# Patient Record
Sex: MALE | Race: BLACK OR AFRICAN AMERICAN | NOT HISPANIC OR LATINO | Employment: FULL TIME | URBAN - METROPOLITAN AREA
[De-identification: names, ages, dates, MRNs, and addresses within clinical notes are randomized per-mention and may not be internally consistent; named-entity substitution may affect disease eponyms.]

---

## 2018-06-11 ENCOUNTER — APPOINTMENT (EMERGENCY)
Dept: RADIOLOGY | Facility: HOSPITAL | Age: 48
End: 2018-06-11
Payer: COMMERCIAL

## 2018-06-11 ENCOUNTER — HOSPITAL ENCOUNTER (EMERGENCY)
Facility: HOSPITAL | Age: 48
Discharge: HOME | End: 2018-06-11
Attending: EMERGENCY MEDICINE | Admitting: EMERGENCY MEDICINE
Payer: COMMERCIAL

## 2018-06-11 VITALS
TEMPERATURE: 97.4 F | HEART RATE: 57 BPM | OXYGEN SATURATION: 99 % | RESPIRATION RATE: 18 BRPM | SYSTOLIC BLOOD PRESSURE: 131 MMHG | DIASTOLIC BLOOD PRESSURE: 84 MMHG

## 2018-06-11 DIAGNOSIS — R07.9 CHEST PAIN: Primary | ICD-10-CM

## 2018-06-11 LAB
ANION GAP SERPL CALCULATED.3IONS-SCNC: 5 MMOL/L (ref 4–13)
ATRIAL RATE: 58 BPM
ATRIAL RATE: 59 BPM
BASOPHILS # BLD AUTO: 0.02 THOUSANDS/ΜL (ref 0–0.1)
BASOPHILS NFR BLD AUTO: 0 % (ref 0–1)
BUN SERPL-MCNC: 13 MG/DL (ref 5–25)
CALCIUM SERPL-MCNC: 9.5 MG/DL (ref 8.3–10.1)
CHLORIDE SERPL-SCNC: 101 MMOL/L (ref 100–108)
CO2 SERPL-SCNC: 31 MMOL/L (ref 21–32)
CREAT SERPL-MCNC: 1.35 MG/DL (ref 0.6–1.3)
EOSINOPHIL # BLD AUTO: 0.09 THOUSAND/ΜL (ref 0–0.61)
EOSINOPHIL NFR BLD AUTO: 1 % (ref 0–6)
ERYTHROCYTE [DISTWIDTH] IN BLOOD BY AUTOMATED COUNT: 13 % (ref 11.6–15.1)
GFR SERPL CREATININE-BSD FRML MDRD: 72 ML/MIN/1.73SQ M
GLUCOSE SERPL-MCNC: 89 MG/DL (ref 65–140)
HCT VFR BLD AUTO: 46.7 % (ref 36.5–49.3)
HGB BLD-MCNC: 15.3 G/DL (ref 12–17)
LYMPHOCYTES # BLD AUTO: 2.57 THOUSANDS/ΜL (ref 0.6–4.47)
LYMPHOCYTES NFR BLD AUTO: 36 % (ref 14–44)
MCH RBC QN AUTO: 28.8 PG (ref 26.8–34.3)
MCHC RBC AUTO-ENTMCNC: 32.8 G/DL (ref 31.4–37.4)
MCV RBC AUTO: 88 FL (ref 82–98)
MONOCYTES # BLD AUTO: 0.64 THOUSAND/ΜL (ref 0.17–1.22)
MONOCYTES NFR BLD AUTO: 9 % (ref 4–12)
NEUTROPHILS # BLD AUTO: 3.87 THOUSANDS/ΜL (ref 1.85–7.62)
NEUTS SEG NFR BLD AUTO: 54 % (ref 43–75)
NRBC BLD AUTO-RTO: 0 /100 WBCS
P AXIS: 14 DEGREES
P AXIS: 17 DEGREES
PLATELET # BLD AUTO: 211 THOUSANDS/UL (ref 149–390)
PMV BLD AUTO: 9.5 FL (ref 8.9–12.7)
POTASSIUM SERPL-SCNC: 4.4 MMOL/L (ref 3.5–5.3)
PR INTERVAL: 116 MS
PR INTERVAL: 120 MS
QRS AXIS: 64 DEGREES
QRS AXIS: 90 DEGREES
QRSD INTERVAL: 80 MS
QRSD INTERVAL: 82 MS
QT INTERVAL: 414 MS
QT INTERVAL: 420 MS
QTC INTERVAL: 409 MS
QTC INTERVAL: 412 MS
RBC # BLD AUTO: 5.32 MILLION/UL (ref 3.88–5.62)
SODIUM SERPL-SCNC: 137 MMOL/L (ref 136–145)
T WAVE AXIS: -18 DEGREES
T WAVE AXIS: -19 DEGREES
TROPONIN I SERPL-MCNC: <0.02 NG/ML
TROPONIN I SERPL-MCNC: <0.02 NG/ML
VENTRICULAR RATE: 58 BPM
VENTRICULAR RATE: 59 BPM
WBC # BLD AUTO: 7.19 THOUSAND/UL (ref 4.31–10.16)

## 2018-06-11 PROCEDURE — 80048 BASIC METABOLIC PNL TOTAL CA: CPT | Performed by: EMERGENCY MEDICINE

## 2018-06-11 PROCEDURE — 84484 ASSAY OF TROPONIN QUANT: CPT | Performed by: EMERGENCY MEDICINE

## 2018-06-11 PROCEDURE — 85025 COMPLETE CBC W/AUTO DIFF WBC: CPT | Performed by: EMERGENCY MEDICINE

## 2018-06-11 PROCEDURE — 99285 EMERGENCY DEPT VISIT HI MDM: CPT

## 2018-06-11 PROCEDURE — 71046 X-RAY EXAM CHEST 2 VIEWS: CPT

## 2018-06-11 PROCEDURE — 93010 ELECTROCARDIOGRAM REPORT: CPT | Performed by: INTERNAL MEDICINE

## 2018-06-11 PROCEDURE — 93005 ELECTROCARDIOGRAM TRACING: CPT

## 2018-06-11 PROCEDURE — 36415 COLL VENOUS BLD VENIPUNCTURE: CPT | Performed by: EMERGENCY MEDICINE

## 2018-06-11 RX ORDER — ACETAMINOPHEN 325 MG/1
650 TABLET ORAL ONCE
Status: COMPLETED | OUTPATIENT
Start: 2018-06-11 | End: 2018-06-11

## 2018-06-11 RX ADMIN — ACETAMINOPHEN 650 MG: 325 TABLET ORAL at 13:09

## 2018-06-11 NOTE — ED PROCEDURE NOTE
PROCEDURE  ECG 12 Lead Documentation  Date/Time: 6/11/2018 3:56 PM  Performed by: Azeem Carter  Authorized by: Azeem Carter     Indications / Diagnosis:  Delta EKG  ECG reviewed by me, the ED Provider: yes    Patient location:  ED  Interpretation:     Interpretation: non-specific    Rate:     ECG rate assessment: normal    Rhythm:     Rhythm: sinus rhythm    Ectopy:     Ectopy: none    QRS:     QRS axis:  Normal  Conduction:     Conduction: normal    ST segments:     ST segments:  Normal  T waves:     T waves: inverted      Inverted:  III         Risa Tejeda,   06/11/18 1557

## 2018-06-11 NOTE — ED PROCEDURE NOTE
PROCEDURE  ECG 12 Lead Documentation  Date/Time: 6/11/2018 12:20 PM  Performed by: Nikita Espino  Authorized by: Nikita Espino     Indications / Diagnosis:  Cp  ECG reviewed by me, the ED Provider: yes    Patient location:  ED  Previous ECG:     Previous ECG:  Compared to current    Comparison ECG info:  5/2016    Similarity:  Changes noted  Interpretation:     Interpretation: abnormal    Rate:     ECG rate:  59    ECG rate assessment: bradycardic    Rhythm:     Rhythm: sinus bradycardia    Ectopy:     Ectopy: none    QRS:     QRS axis:  Normal  Conduction:     Conduction: normal    ST segments:     ST segments:  Normal  T waves:     T waves: inverted      Inverted:  III and aVF         Keyshawn Walsh DO  06/11/18 1221

## 2018-06-11 NOTE — ED PROVIDER NOTES
History  Chief Complaint   Patient presents with    Chest Pain     pt reports while at work he developed dry mouth, chest tightness, and lightheadedness  pt received 324mg aspirin and 1 0 4mcg tablet of nitro with relief  25-year-old male with history of hypertension presents to the emergency department with an episode of chest tightness and lightheadedness that occurred while at work today  Patient states he was pulling orders when he felt like his mouth was dry and he had chest tightness  He also felt lightheaded  He went to the health center at his work and was found his blood pressure was elevated  EMS was called  He was given nitroglycerin sublingual and aspirin  He is uncertain how long the symptoms lasted but he is currently asymptomatic  No diaphoresis or vomiting during the episode   used: No    Chest Pain   Chest pain location: Entire precordium  Pain quality: tightness    Pain radiates to:  Does not radiate  Pain radiates to the back: no    Pain severity:  Unable to specify  Onset quality:  Sudden  Duration: Uncertain  Progression:  Resolved  Chronicity:  New  Context: at rest    Relieved by: Discomfort was already relieved before nitroglycerin  Worsened by:  Nothing tried  Associated symptoms: no abdominal pain, no altered mental status, no anorexia, no anxiety, no back pain, no claudication, no cough, no diaphoresis, no dizziness (Lightheaded), no dysphagia, no fatigue, no fever, no headache, no heartburn, no lower extremity edema, no nausea, no near-syncope, no numbness, no orthopnea, no palpitations, no PND, no shortness of breath, no syncope, not vomiting and no weakness        Prior to Admission Medications   Prescriptions Last Dose Informant Patient Reported? Taking? DiphenhydrAMINE HCl (BENADRYL ALLERGY PO)   Yes No   Sig: Take by mouth  GARLIC PO   Yes No   Sig: Take by mouth  co-enzyme Q-10 30 MG capsule   Yes No   Sig: Take by mouth  Facility-Administered Medications: None       Past Medical History:   Diagnosis Date    Blind right eye     Hypertension        Past Surgical History:   Procedure Laterality Date    EYE SURGERY         History reviewed  No pertinent family history  I have reviewed and agree with the history as documented  Social History   Substance Use Topics    Smoking status: Never Smoker    Smokeless tobacco: Never Used    Alcohol use Yes      Comment: occasional        Review of Systems   Constitutional: Negative  Negative for diaphoresis, fatigue and fever  HENT: Negative  Negative for trouble swallowing  Eyes: Negative  Respiratory: Negative  Negative for cough and shortness of breath  Cardiovascular: Positive for chest pain  Negative for palpitations, orthopnea, claudication, leg swelling, syncope, PND and near-syncope  Gastrointestinal: Negative  Negative for abdominal pain, anorexia, heartburn, nausea and vomiting  Genitourinary: Negative  Musculoskeletal: Negative for back pain and neck pain  Skin: Negative  Allergic/Immunologic: Negative  Neurological: Positive for light-headedness  Negative for dizziness (Lightheaded), weakness, numbness and headaches  Hematological: Negative  Psychiatric/Behavioral: Negative  All other systems reviewed and are negative  Physical Exam  Physical Exam   Constitutional: He is oriented to person, place, and time  He appears well-developed and well-nourished  Non-toxic appearance  He does not have a sickly appearance  He does not appear ill  No distress  HENT:   Head: Normocephalic and atraumatic  Right Ear: External ear normal    Left Ear: External ear normal    Mouth/Throat: Oropharynx is clear and moist    Eyes: Conjunctivae are normal  No scleral icterus  Right eye opacified   Neck: Normal range of motion  Neck supple  No JVD present  Cardiovascular: Normal rate, regular rhythm and normal heart sounds      Pulmonary/Chest: Effort normal and breath sounds normal    Abdominal: Soft  Bowel sounds are normal  He exhibits no distension and no mass  There is no tenderness  No hernia  Musculoskeletal: Normal range of motion  He exhibits no edema, tenderness or deformity  Lymphadenopathy:     He has no cervical adenopathy  Neurological: He is alert and oriented to person, place, and time  He has normal strength and normal reflexes  He exhibits normal muscle tone  Skin: Skin is warm and dry  No rash noted  He is not diaphoretic  No erythema  No pallor  Psychiatric: He has a normal mood and affect  Nursing note and vitals reviewed  Vital Signs  ED Triage Vitals [06/11/18 1200]   Temperature Pulse Respirations Blood Pressure SpO2   (!) 97 4 °F (36 3 °C) 66 17 127/74 100 %      Temp Source Heart Rate Source Patient Position - Orthostatic VS BP Location FiO2 (%)   Oral Monitor Lying Left arm --      Pain Score       --           Vitals:    06/11/18 1200 06/11/18 1311 06/11/18 1438   BP: 127/74 133/75 128/79   Pulse: 66 60 (!) 54   Patient Position - Orthostatic VS: Lying Lying Lying       Visual Acuity  Visual Acuity      Most Recent Value   L Pupil Size (mm)  4   R Pupil Size (mm)  Other (Comment) [blind]          ED Medications  Medications   acetaminophen (TYLENOL) tablet 650 mg (650 mg Oral Given 6/11/18 1309)       Diagnostic Studies  Results Reviewed     Procedure Component Value Units Date/Time    Troponin I [[id number]]  (Normal) Collected:  06/11/18 1532    Lab Status:  Final result Specimen:  Blood from Arm, Left Updated:  06/11/18 1559     Troponin I <0 02 ng/mL     Narrative:         [employer] Chemistry analyzer 99% cutoff is > 0 04 ng/mL in network labs    o cTnI 99% cutoff is useful only when applied to patients in the clinical setting of myocardial ischemia  o cTnI 99% cutoff should be interpreted in the context of clinical history, ECG findings and possibly cardiac imaging to establish correct diagnosis    o cTnI 99% cutoff may be suggestive but clearly not indicative of a coronary event without the clinical setting of myocardial ischemia  Troponin I [[id number]]  (Normal) Collected:  06/11/18 1220    Lab Status:  Final result Specimen:  Blood from Arm, Left Updated:  06/11/18 1301     Troponin I <0 02 ng/mL     Narrative:         [employer] Chemistry analyzer 99% cutoff is > 0 04 ng/mL in network labs    o cTnI 99% cutoff is useful only when applied to patients in the clinical setting of myocardial ischemia  o cTnI 99% cutoff should be interpreted in the context of clinical history, ECG findings and possibly cardiac imaging to establish correct diagnosis  o cTnI 99% cutoff may be suggestive but clearly not indicative of a coronary event without the clinical setting of myocardial ischemia  Basic metabolic panel [[id number]]  (Abnormal) Collected:  06/11/18 1220    Lab Status:  Final result Specimen:  Blood from Arm, Left Updated:  06/11/18 1253     Sodium 137 mmol/L      Potassium 4 4 mmol/L      Chloride 101 mmol/L      CO2 31 mmol/L      Anion Gap 5 mmol/L      BUN 13 mg/dL      Creatinine 1 35 (H) mg/dL      Glucose 89 mg/dL      Calcium 9 5 mg/dL      eGFR 72 ml/min/1 73sq m     Narrative:         National Kidney Disease Education Program recommendations are as follows:  GFR calculation is accurate only with a steady state creatinine  Chronic Kidney disease less than 60 ml/min/1 73 sq  meters  Kidney failure less than 15 ml/min/1 73 sq  meters      CBC and differential [[id number]] Collected:  06/11/18 1220    Lab Status:  Final result Specimen:  Blood from Arm, Left Updated:  06/11/18 1239     WBC 7 19 Thousand/uL      RBC 5 32 Million/uL      Hemoglobin 15 3 g/dL      Hematocrit 46 7 %      MCV 88 fL      MCH 28 8 pg      MCHC 32 8 g/dL      RDW 13 0 %      MPV 9 5 fL      Platelets 287 Thousands/uL      nRBC 0 /100 WBCs      Neutrophils Relative 54 %      Lymphocytes Relative 36 %      Monocytes Relative 9 %      Eosinophils Relative 1 %      Basophils Relative 0 %      Neutrophils Absolute 3 87 Thousands/µL      Lymphocytes Absolute 2 57 Thousands/µL      Monocytes Absolute 0 64 Thousand/µL      Eosinophils Absolute 0 09 Thousand/µL      Basophils Absolute 0 02 Thousands/µL                  XR chest 2 views   Final Result by Laurel Stanton DO (06/11 1405)      No acute cardiopulmonary disease  Workstation performed: [id number]                    Procedures  Procedures       Phone Contacts  ED Phone Contact    ED Course  ED Course as of Jun 11 1604   Mon Jun 11, 2018   1417 Patient updated regarding results and need for repeat troponin at about 330          HEART Risk Score      Most Recent Value   History  0 Filed at: 06/11/2018 1228   ECG  1 Filed at: 06/11/2018 1228   Age  1 Filed at: 06/11/2018 1228   Risk Factors  1 Filed at: 06/11/2018 1228   Troponin  0 Filed at: 06/11/2018 1228   Heart Score Risk Calculator   History  0 Filed at: 06/11/2018 1228   ECG  1 Filed at: 06/11/2018 1228   Age  1 Filed at: 06/11/2018 1228   Risk Factors  1 Filed at: 06/11/2018 1228   Troponin  0 Filed at: 06/11/2018 1228   HEART Score  3 Filed at: 06/11/2018 1228   HEART Score  3 Filed at: 06/11/2018 1228                            MDM  Number of Diagnoses or Management Options  Diagnosis management comments: 27-year-old male presents with an episode of chest tightness, dry mouth and lightheadedness while at work  No diaphoresis or vomiting  No radiation of the tightness  Currently is asymptomatic  On exam he is in no acute distress  His exam here is normal  His EKG does show some changes from previous, however this may be related to lead placement    Given his age and history of hypertension will do cardiac workup plus delta troponin and EKG in 3 hours       Amount and/or Complexity of Data Reviewed  Clinical lab tests: ordered and reviewed  Tests in the radiology section of CPT®: ordered and reviewed  Independent visualization of images, tracings, or specimens: yes      CritCare Time    Disposition  Final diagnoses:   Chest pain     Time reflects when diagnosis was documented in both MDM as applicable and the Disposition within this note     Time User Action Codes Description Comment    6/11/2018  4:03 PM Charles ARDON Add [R07 9] Chest pain       ED Disposition     ED Disposition Condition Comment    Discharge  Caffie Hurry discharge to home/self care  Condition at discharge: Good        Follow-up Information     Follow up With Specialties Details Why [address] [facility] [address] Family Medicine Schedule an appointment as soon as possible for a visit As needed [address] [place] [address] Drive [address] [phone]            Patient's Medications   Discharge Prescriptions    No medications on file       Outpatient Discharge Orders  Stress test only, exercise   Standing Status: Future  Standing Exp   Date: 06/11/22         ED Provider  Electronically Signed by           Lizette Gonzalez DO  06/11/18 1607

## 2018-07-28 ENCOUNTER — APPOINTMENT (EMERGENCY)
Dept: RADIOLOGY | Facility: HOSPITAL | Age: 48
End: 2018-07-28
Payer: COMMERCIAL

## 2018-07-28 ENCOUNTER — HOSPITAL ENCOUNTER (EMERGENCY)
Facility: HOSPITAL | Age: 48
Discharge: HOME | End: 2018-07-28
Attending: EMERGENCY MEDICINE
Payer: COMMERCIAL

## 2018-07-28 VITALS
TEMPERATURE: 97.1 F | BODY MASS INDEX: 34.67 KG/M2 | WEIGHT: 270 LBS | HEART RATE: 74 BPM | SYSTOLIC BLOOD PRESSURE: 146 MMHG | RESPIRATION RATE: 16 BRPM | DIASTOLIC BLOOD PRESSURE: 88 MMHG | OXYGEN SATURATION: 98 %

## 2018-07-28 DIAGNOSIS — N18.9 CHRONIC KIDNEY DISEASE: ICD-10-CM

## 2018-07-28 DIAGNOSIS — R55 SYNCOPE: Primary | ICD-10-CM

## 2018-07-28 LAB
ALBUMIN SERPL BCP-MCNC: 3.6 G/DL (ref 3.5–5)
ALP SERPL-CCNC: 49 U/L (ref 46–116)
ALT SERPL W P-5'-P-CCNC: 29 U/L (ref 12–78)
ANION GAP SERPL CALCULATED.3IONS-SCNC: 9 MMOL/L (ref 4–13)
AST SERPL W P-5'-P-CCNC: 23 U/L (ref 5–45)
ATRIAL RATE: 75 BPM
BASOPHILS # BLD AUTO: 0.02 THOUSANDS/ΜL (ref 0–0.1)
BASOPHILS NFR BLD AUTO: 0 % (ref 0–1)
BILIRUB SERPL-MCNC: 0.63 MG/DL (ref 0.2–1)
BUN SERPL-MCNC: 13 MG/DL (ref 5–25)
CALCIUM SERPL-MCNC: 8.7 MG/DL (ref 8.3–10.1)
CHLORIDE SERPL-SCNC: 106 MMOL/L (ref 100–108)
CO2 SERPL-SCNC: 26 MMOL/L (ref 21–32)
CREAT SERPL-MCNC: 1.5 MG/DL (ref 0.6–1.3)
EOSINOPHIL # BLD AUTO: 0.04 THOUSAND/ΜL (ref 0–0.61)
EOSINOPHIL NFR BLD AUTO: 1 % (ref 0–6)
ERYTHROCYTE [DISTWIDTH] IN BLOOD BY AUTOMATED COUNT: 12.6 % (ref 11.6–15.1)
GFR SERPL CREATININE-BSD FRML MDRD: 63 ML/MIN/1.73SQ M
GLUCOSE SERPL-MCNC: 104 MG/DL (ref 65–140)
HCT VFR BLD AUTO: 47.9 % (ref 36.5–49.3)
HGB BLD-MCNC: 15.4 G/DL (ref 12–17)
IMM GRANULOCYTES # BLD AUTO: 0.01 THOUSAND/UL (ref 0–0.2)
IMM GRANULOCYTES NFR BLD AUTO: 0 % (ref 0–2)
LYMPHOCYTES # BLD AUTO: 1.82 THOUSANDS/ΜL (ref 0.6–4.47)
LYMPHOCYTES NFR BLD AUTO: 32 % (ref 14–44)
MCH RBC QN AUTO: 28.1 PG (ref 26.8–34.3)
MCHC RBC AUTO-ENTMCNC: 32.2 G/DL (ref 31.4–37.4)
MCV RBC AUTO: 87 FL (ref 82–98)
MONOCYTES # BLD AUTO: 0.64 THOUSAND/ΜL (ref 0.17–1.22)
MONOCYTES NFR BLD AUTO: 11 % (ref 4–12)
NEUTROPHILS # BLD AUTO: 3.09 THOUSANDS/ΜL (ref 1.85–7.62)
NEUTS SEG NFR BLD AUTO: 56 % (ref 43–75)
NRBC BLD AUTO-RTO: 0 /100 WBCS
P AXIS: 58 DEGREES
PLATELET # BLD AUTO: 240 THOUSANDS/UL (ref 149–390)
PMV BLD AUTO: 9.3 FL (ref 8.9–12.7)
POTASSIUM SERPL-SCNC: 4.1 MMOL/L (ref 3.5–5.3)
PR INTERVAL: 126 MS
PROT SERPL-MCNC: 7.3 G/DL (ref 6.4–8.2)
QRS AXIS: 23 DEGREES
QRSD INTERVAL: 82 MS
QT INTERVAL: 364 MS
QTC INTERVAL: 406 MS
RBC # BLD AUTO: 5.48 MILLION/UL (ref 3.88–5.62)
SODIUM SERPL-SCNC: 141 MMOL/L (ref 136–145)
T WAVE AXIS: 19 DEGREES
TROPONIN I SERPL-MCNC: <0.02 NG/ML
TROPONIN I SERPL-MCNC: <0.02 NG/ML
VENTRICULAR RATE: 75 BPM
WBC # BLD AUTO: 5.62 THOUSAND/UL (ref 4.31–10.16)

## 2018-07-28 PROCEDURE — 84484 ASSAY OF TROPONIN QUANT: CPT | Performed by: EMERGENCY MEDICINE

## 2018-07-28 PROCEDURE — 93010 ELECTROCARDIOGRAM REPORT: CPT | Performed by: INTERNAL MEDICINE

## 2018-07-28 PROCEDURE — 93005 ELECTROCARDIOGRAM TRACING: CPT

## 2018-07-28 PROCEDURE — 85025 COMPLETE CBC W/AUTO DIFF WBC: CPT | Performed by: EMERGENCY MEDICINE

## 2018-07-28 PROCEDURE — 36415 COLL VENOUS BLD VENIPUNCTURE: CPT

## 2018-07-28 PROCEDURE — 80053 COMPREHEN METABOLIC PANEL: CPT | Performed by: EMERGENCY MEDICINE

## 2018-07-28 PROCEDURE — 71046 X-RAY EXAM CHEST 2 VIEWS: CPT

## 2018-07-28 PROCEDURE — 99284 EMERGENCY DEPT VISIT MOD MDM: CPT

## 2018-07-28 RX ORDER — AMLODIPINE BESYLATE 2.5 MG/1
5 TABLET ORAL 2 TIMES DAILY
COMMUNITY

## 2018-07-28 RX ORDER — LORAZEPAM 0.5 MG/1
0.5 TABLET ORAL
COMMUNITY

## 2018-07-28 NOTE — ED NOTES
Patient's fiance witnessed patient fall and strike his shoulder and hip outside of counseling building  Patient states he became upset during the session and walked out  Patient states that when he becomes upset he has these episodes of syncope  Patient states that his chest felt "tight" prior to the fall        [facility]  07/28/18 3509 [facility]  07/28/18 5806

## 2018-07-28 NOTE — ED PROVIDER NOTES
History  Chief Complaint   Patient presents with    Syncope     Patient experienced syncope this afternoon, had experienced this before in relation to HTN, was told he hit his head, pt does not recall this     26-year-old male with previous medical history of hypertension, anxiety presenting after a syncopal event  Patient notes that he was in counseling and was talking about his mother  At that point he got very anxious and started shaking and his chest felt tight  Patient walked outside and the next thing he remembers he was on the ground  No retrograde amnesia  No nausea, no vomiting  Patient is not on blood thinners  Patient notes that he has the syncopal events every once in a while when he gets anxious his blood pressure goes up and then he become syncopal   Last time he was syncopal was 2 months ago approximately  Patient notes no nausea and, vomiting, diarrhea, constipation, fevers, chills, numbness, tingling, weakness, focal neurological deficit  Patient denies any pain  Patient's fiancee witnessed ground level fall  Patient was noted to have taken most of the impact to his left shoulder with a glancing blow to the left lateral head  Fiance isn't ill and patient have not noticed any abrasion on the patient's body  Syncope   Episode history:  Single  Most recent episode: Today  Progression:  Resolved  Chronicity:  Recurrent  Context comment:  Increased anxiety/ blood pressure  Witnessed: yes    Exacerbated by: agitation/ high blood pressure  Associated symptoms: no chest pain, no dizziness, no fever, no nausea, no palpitations, no shortness of breath, no vomiting and no weakness        Prior to Admission Medications   Prescriptions Last Dose Informant Patient Reported? Taking? DiphenhydrAMINE HCl (BENADRYL ALLERGY PO)   Yes No   Sig: Take by mouth  GARLIC PO   Yes No   Sig: Take by mouth     LORazepam (ATIVAN) 0 5 mg tablet   Yes Yes   Sig: Take 0 5 mg by mouth Medrol Dose Pack scheduling ONLY   amLODIPine (NORVASC) 2 5 mg tablet   Yes Yes   Sig: Take 2 5 mg by mouth daily   co-enzyme Q-10 30 MG capsule   Yes No   Sig: Take by mouth  Facility-Administered Medications: None       Past Medical History:   Diagnosis Date    Blind right eye     Hypertension        Past Surgical History:   Procedure Laterality Date    EYE SURGERY         History reviewed  No pertinent family history  I have reviewed and agree with the history as documented  Social History   Substance Use Topics    Smoking status: Never Smoker    Smokeless tobacco: Never Used    Alcohol use Yes      Comment: occasional        Review of Systems   Constitutional: Negative for chills and fever  HENT: Negative for ear pain, rhinorrhea and sore throat  Eyes: Negative for photophobia and pain  Respiratory: Negative for cough, chest tightness and shortness of breath  Cardiovascular: Positive for syncope  Negative for chest pain, palpitations and leg swelling  Gastrointestinal: Negative for abdominal pain, blood in stool, constipation, diarrhea, nausea and vomiting  Endocrine: Negative for polyuria  Genitourinary: Negative for dysuria, frequency, hematuria and urgency  Musculoskeletal: Negative for arthralgias  Skin: Negative for rash  Neurological: Negative for dizziness, weakness and numbness  Psychiatric/Behavioral: The patient is not nervous/anxious  All other systems reviewed and are negative        Physical Exam  ED Triage Vitals [07/28/18 1135]   Temperature Pulse Respirations Blood Pressure SpO2   (!) 97 1 °F (36 2 °C) 86 18 166/95 96 %      Temp src Heart Rate Source Patient Position - Orthostatic VS BP Location FiO2 (%)   -- Monitor Lying Right arm --      Pain Score       No Pain           Orthostatic Vital Signs  Vitals:    07/28/18 1243 07/28/18 1340 07/28/18 1415 07/28/18 1518   BP: 142/83 138/77 145/85 146/88   Pulse: 65 72 68 74   Patient Position - Orthostatic VS:   Sitting Physical Exam   Constitutional: He appears well-developed and well-nourished  No distress  HENT:   Head: Normocephalic and atraumatic  Right Ear: External ear normal    Left Ear: External ear normal    No hemotympanum   Eyes: Conjunctivae and EOM are normal    Neck: No JVD present  No tracheal deviation present  Cardiovascular: Normal rate, regular rhythm, normal heart sounds and intact distal pulses  No murmur heard  Pulmonary/Chest: Breath sounds normal  No stridor  No respiratory distress  He has no wheezes  He has no rales  Abdominal: Soft  Bowel sounds are normal  He exhibits no distension and no mass  There is no tenderness  There is no rebound and no guarding  Genitourinary:   Genitourinary Comments: Deferred   Musculoskeletal: He exhibits no edema, tenderness or deformity  No C-spine, T-spine, L-spine tenderness  Neurological: He exhibits normal muscle tone  Coordination normal    Cranial nerves 2-12 intact bilaterally, negative point-to-point exam, negative heel to shin, negative pronator drift, patient walks with his right leg externally rotated, but otherwise has no gross irregularities in his stride  GCS 15  No periaortic orbital ecchymosis  No Lubin sign  Skin: Skin is warm and dry  Capillary refill takes less than 2 seconds  No rash noted  He is not diaphoretic  No erythema  No pallor  Psychiatric: He has a normal mood and affect   His behavior is normal  Judgment and thought content normal        ED Medications  Medications - No data to display    Diagnostic Studies  Results Reviewed     Procedure Component Value Units Date/Time    Troponin I [[id number]]  (Normal) Collected:  07/28/18 1516    Lab Status:  Final result Specimen:  Blood from Arm, Left Updated:  07/28/18 1540     Troponin I <0 02 ng/mL     Comprehensive metabolic panel [[id number]]  (Abnormal) Collected:  07/28/18 1150    Lab Status:  Final result Specimen:  Blood from Hand, Left Updated:  07/28/18 1223 Sodium 141 mmol/L      Potassium 4 1 mmol/L      Chloride 106 mmol/L      CO2 26 mmol/L      Anion Gap 9 mmol/L      BUN 13 mg/dL      Creatinine 1 50 (H) mg/dL      Glucose 104 mg/dL      Calcium 8 7 mg/dL      AST 23 U/L      ALT 29 U/L      Alkaline Phosphatase 49 U/L      Total Protein 7 3 g/dL      Albumin 3 6 g/dL      Total Bilirubin 0 63 mg/dL      eGFR 63 ml/min/1 73sq m     Narrative:         National Kidney Disease Education Program recommendations are as follows:  GFR calculation is accurate only with a steady state creatinine  Chronic Kidney disease less than 60 ml/min/1 73 sq  meters  Kidney failure less than 15 ml/min/1 73 sq  meters  Troponin I [[id number]]  (Normal) Collected:  07/28/18 1150    Lab Status:  Final result Specimen:  Blood from Hand, Left Updated:  07/28/18 1222     Troponin I <0 02 ng/mL     CBC and differential [[id number]] Collected:  07/28/18 1150    Lab Status:  Final result Specimen:  Blood from Hand, Left Updated:  07/28/18 1206     WBC 5 62 Thousand/uL      RBC 5 48 Million/uL      Hemoglobin 15 4 g/dL      Hematocrit 47 9 %      MCV 87 fL      MCH 28 1 pg      MCHC 32 2 g/dL      RDW 12 6 %      MPV 9 3 fL      Platelets 983 Thousands/uL      nRBC 0 /100 WBCs      Neutrophils Relative 56 %      Immat GRANS % 0 %      Lymphocytes Relative 32 %      Monocytes Relative 11 %      Eosinophils Relative 1 %      Basophils Relative 0 %      Neutrophils Absolute 3 09 Thousands/µL      Immature Grans Absolute 0 01 Thousand/uL      Lymphocytes Absolute 1 82 Thousands/µL      Monocytes Absolute 0 64 Thousand/µL      Eosinophils Absolute 0 04 Thousand/µL      Basophils Absolute 0 02 Thousands/µL                  X-ray chest 2 views   ED Interpretation by Sorin Nguyen DO (07/28 1233)   No acute findings  Final Result by Sally Kennedy DO (07/28 1332)      No acute cardiopulmonary disease              Workstation performed: [id number]               Procedures  Procedures      Phone Consults  ED Phone Contact    ED Course         HEART Risk Score      Most Recent Value   History  0 Filed at: 07/28/2018 1536   ECG  0 Filed at: 07/28/2018 1536   Age  1 Filed at: 07/28/2018 1536   Risk Factors  1 Filed at: 07/28/2018 1536   Troponin  0 Filed at: 07/28/2018 1536   Heart Score Risk Calculator   History  0 Filed at: 07/28/2018 1536   ECG  0 Filed at: 07/28/2018 1536   Age  1 Filed at: 07/28/2018 1536   Risk Factors  1 Filed at: 07/28/2018 1536   Troponin  0 Filed at: 07/28/2018 1536   HEART Score  2 Filed at: 07/28/2018 1536   HEART Score  2 Filed at: 07/28/2018 1536                            MDM  Number of Diagnoses or Management Options  Chronic kidney disease: new and requires workup  Syncope: established and worsening  Diagnosis management comments: 35-year-old male with previous medical history of hypertension and anxiety  Patient had a syncopal event today  This event happened at approximately 11:15 a m  Priya Coyne Patient clears C-spine via [facility] CT head and neck rules  No signs of trauma the patient isn't on blood thinners  Patient had chest tightness before this event happened  Will do a chest pain workup including chest x-ray, troponin, delta troponin at 3:15 p m , and ECG  12:33 PM  Patient has a creatinine of 1 50  Review of previous medical history reveals elevated creatinine in the past though his baseline seems to be lower than that  Will inform patient he likely has chronic kidney disease  2:09 PM  Delta troponin scheduled for 3:15 p m  If patient has a negative troponin at that point he will be discharged home  Patient informed of his elevated creatinine and and I asked him to visit with his primary care provider about likely chronic kidney disease that he has     3:51 PM  Second troponin was negative  Patient to be discharged home  Patient will follow up with primary care provider  Patient has a primary care provider appointment scheduled for Monday    I recommended that he receive a stress test   I also recommended that he follow up on his chronic kidney disease  Also recommended that he continue to work with his PCP to manage his anxiety better  Amount and/or Complexity of Data Reviewed  Clinical lab tests: ordered and reviewed  Tests in the radiology section of CPT®: ordered and reviewed  Tests in the medicine section of CPT®: ordered and reviewed  Review and summarize past medical records: yes  Independent visualization of images, tracings, or specimens: yes    Risk of Complications, Morbidity, and/or Mortality  Presenting problems: moderate  Diagnostic procedures: moderate  Management options: low    Patient Progress  Patient progress: resolved    CritCare Time    Disposition  Final diagnoses:   Syncope   Chronic kidney disease     Time reflects when diagnosis was documented in both MDM as applicable and the Disposition within this note     Time User Action Codes Description Comment    7/28/2018  2:11 PM Mar Guzman Add [R55] Syncope     7/28/2018  2:15 PM Mar Guzman Add [N18 9] Chronic kidney disease       ED Disposition     ED Disposition Condition Comment    Discharge  Phuong Nowak discharge to home/self care  Condition at discharge: Good        Follow-up Information     Follow up With Specialties Details Why [address] CARTER Miramontes MD Internal Medicine In 1 week for chronic kidney disease [address] [facility] [phone]       [facility] Emergency Department Emergency Medicine  If symptoms worsen [address] [place] [address]  [phone]  ED, [address] [place] [address], [id number]          Patient's Medications   Discharge Prescriptions    No medications on file     No discharge procedures on file  ED Provider  Attending physically available and evaluated Phuong Nowak  I managed the patient along with the ED Attending      Electronically Signed by         Sorin Nguyen,   07/28/18 1557

## 2018-07-28 NOTE — DISCHARGE INSTRUCTIONS
Please follow up with your PCP regarding your elevated creatinine/ probable chronic kidney disease as well as your syncope/ anxiety  Syncope   WHAT YOU NEED TO KNOW:   Syncope is also called fainting or passing out  Syncope is a sudden, temporary loss of consciousness, followed by a fall from a standing or sitting position  Syncope ranges from not serious to a sign of a more serious condition that needs to be treated  You can control some health conditions that cause syncope  Your healthcare providers can help you create a plan to manage syncope and prevent episodes  DISCHARGE INSTRUCTIONS:   Seek care immediately if:   · You are bleeding because you hit your head when you fainted  · You suddenly have double vision, difficulty speaking, numbness, and cannot move your arms or legs  · You have chest pain and trouble breathing  · You vomit blood or material that looks like coffee grounds  · You see blood in your bowel movement  Contact your healthcare provider if:   · You have new or worsening symptoms  · You have another syncope episode  · You have a headache, fast heartbeat, or feel too dizzy to stand up  · You have questions or concerns about your condition or care  Follow up with your healthcare provider as directed:  Write down your questions so you remember to ask them during your visits  Manage syncope:   · Keep a record of your syncope episodes  Include your symptoms and your activity before and after the episode  The record can help your healthcare provider find the cause of your syncope and help you manage episodes  · Sit or lie down when needed  This includes when you feel dizzy, your throat is getting tight, and your vision changes  Raise your legs above the level of your heart  · Take slow, deep breaths if you start to breathe faster with anxiety or fear  This can help decrease dizziness and the feeling that you might faint  · Check your blood pressure often  This is important if you take medicine to lower your blood pressure  Check your blood pressure when you are lying down and when you are standing  Ask how often to check during the day  Keep a record of your blood pressure numbers  Your healthcare provider may use the record to help plan your treatment  Prevent a syncope episode:   · Move slowly and let yourself get used to one position before you move to another position  This is very important when you change from a lying or sitting position to a standing position  Take some deep breaths before you stand up from a lying position  Stand up slowly  Sudden movements may cause a fainting spell  Sit on the side of the bed or couch for a few minutes before you stand up  If you are on bedrest, try to be upright for about 2 hours each day, or as directed  Do not lock your legs if you are standing for a long period of time  Move your legs and bend your knees to keep blood flowing  · Follow your healthcare provider's recommendations  Your provider may  recommend that you drink more liquids to prevent dehydration  You may also need to have more salt to keep your blood pressure from dropping too low and causing syncope  Your provider will tell you how much liquid and sodium to have each day  · Watch for signs of low blood sugar  These include hunger, nervousness, sweating, and fast or fluttery heartbeats  Talk with your healthcare provider about ways to keep your blood sugar level steady  · Do not strain if you are constipated  You may faint if you strain to have a bowel movement  Walking is the best way to get your bowels moving  Eat foods high in fiber to make it easier to have a bowel movement  Good examples are high-fiber cereals, beans, vegetables, and whole-grain breads  Prune juice may help make bowel movements softer  · Be careful in hot weather  Heat can cause a syncope episode  Limit activity done outside on hot days   Physical activity in hot weather can lead to dehydration  This can cause an episode  © 2017 [address] [employer]  Information is for End User's use only and may not be sold, redistributed or otherwise used for commercial purposes  All illustrations and images included in CareNotes® are the copyrighted property of A D [employer] M [employer]  or [employer]  The above information is an  only  It is not intended as medical advice for individual conditions or treatments  Talk to your doctor, nurse or pharmacist before following any medical regimen to see if it is safe and effective for you

## 2018-07-28 NOTE — ED ATTENDING ATTESTATION
Basia Urbina MD, saw and evaluated the patient  I have discussed the patient with the resident/non-physician practitioner and agree with the resident's/non-physician practitioner's findings, Plan of Care, and MDM as documented in the resident's/non-physician practitioner's note, except where noted  All available labs and Radiology studies were reviewed  At this point I agree with the current assessment done in the Emergency Department  I have conducted an independent evaluation of this patient a history and physical is as follows:    Patient presents after a syncopal episode while at therapy today  Patient states that he got very anxious while talking about his mother  He states that he walked outside and woke up on the ground  Patient does report having had prior episodes similar to this 1, last being 2 months ago  Typically, patient finds at that his blood pressure is elevated which makes him anxious and causing him to syncopized  Patient denies any current complaints  Exam: AAOx3, NAD, RRR, CTA, S/NT/ND, no motor/sensory deficits  A/P:  Syncope  Will check EKG, labs, and chest x-ray      Critical Care Time  CritCare Time    Procedures

## 2018-07-28 NOTE — ED PROCEDURE NOTE
Procedure  ECG 12 Lead Documentation  Date/Time: 7/28/2018 12:20 PM  Performed by: Bryan Roe  Authorized by: Bryan Roe     Indications / Diagnosis:  Fall  ECG reviewed by me, the ED Provider: yes    Patient location:  ED  Previous ECG:     Previous ECG:  Compared to current    Comparison ECG info:  11-jun-2018    Similarity:  No change    Comparison to cardiac monitor: Yes    Interpretation:     Interpretation: normal    Quality:     Tracing quality:  Limited by artifact  Rate:     ECG rate:  75    ECG rate assessment: normal    Rhythm:     Rhythm: sinus rhythm    Ectopy:     Ectopy: none    QRS:     QRS axis:  Normal    QRS intervals:  Normal  Conduction:     Conduction: normal    ST segments:     ST segments:  Normal  T waves:     T waves: normal    Comments:      T wave inversion in III resolved  Bimodal T wave in aVF resolved                       Jeremy Mariee 66, DO  07/28/18 1222

## 2018-08-03 ENCOUNTER — OFFICE VISIT (OUTPATIENT)
Dept: NEPHROLOGY | Facility: CLINIC | Age: 48
End: 2018-08-03
Payer: COMMERCIAL

## 2018-08-03 VITALS
BODY MASS INDEX: 35.42 KG/M2 | HEART RATE: 70 BPM | SYSTOLIC BLOOD PRESSURE: 140 MMHG | HEIGHT: 74 IN | WEIGHT: 276 LBS | DIASTOLIC BLOOD PRESSURE: 90 MMHG

## 2018-08-03 DIAGNOSIS — R80.9 PROTEINURIA, UNSPECIFIED TYPE: ICD-10-CM

## 2018-08-03 DIAGNOSIS — N18.2 CHRONIC KIDNEY DISEASE, STAGE 2 (MILD): ICD-10-CM

## 2018-08-03 DIAGNOSIS — I10 BENIGN ESSENTIAL HTN: Primary | ICD-10-CM

## 2018-08-03 PROBLEM — F41.9 ANXIETY: Status: ACTIVE | Noted: 2018-08-03

## 2018-08-03 LAB
SL AMB  POCT GLUCOSE, UA: ABNORMAL
SL AMB LEUKOCYTE ESTERASE,UA: ABNORMAL
SL AMB POCT BILIRUBIN,UA: ABNORMAL
SL AMB POCT BLOOD,UA: ABNORMAL
SL AMB POCT CLARITY,UA: CLEAR
SL AMB POCT COLOR,UA: YELLOW
SL AMB POCT KETONES,UA: ABNORMAL
SL AMB POCT NITRITE,UA: ABNORMAL
SL AMB POCT PH,UA: 5
SL AMB POCT SPECIFIC GRAVITY,UA: 1.02
SL AMB POCT URINE PROTEIN: 100
SL AMB POCT UROBILINOGEN: 0.2

## 2018-08-03 PROCEDURE — 99203 OFFICE O/P NEW LOW 30 MIN: CPT | Performed by: INTERNAL MEDICINE

## 2018-08-03 PROCEDURE — 81002 URINALYSIS NONAUTO W/O SCOPE: CPT | Performed by: INTERNAL MEDICINE

## 2018-08-03 RX ORDER — ESCITALOPRAM OXALATE 10 MG/1
5 TABLET ORAL DAILY
COMMUNITY

## 2018-08-03 RX ORDER — LOSARTAN POTASSIUM 25 MG/1
25 TABLET ORAL DAILY
Qty: 30 TABLET | Refills: 4 | Status: SHIPPED | OUTPATIENT
Start: 2018-08-03

## 2018-08-03 NOTE — PROGRESS NOTES
NEPHROLOGY OUTPATIENT CONSULTATION   Saida Pederson 52 y o  male MRN: [id number]  Date: 8/3/2018  Reason for consultation:   Chief Complaint   Patient presents with    Consult    Abnormal Lab    Hypertension       ASSESSMENT AND PLAN:    Primary HTN  - BP elevated  Started on losartan for hypertension as well as proteinuria  Check BMP in 2 weeks  If tolerated would consider increasing dose of losartan further and adjusting amlodipine dose as losartan would also help with proteinuria  Discussed about side effects of losartan such as increase in creatinine as well as hyperkalemia   -stopping NSAIDs would also help control of blood pressure  - Discussed home monitoring of blood pressure  Goal blood pressure less than 130/80  CKD stage 2  - has egfr of around 63 with proteinuria  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 as well as use of NSAIDs   -  Discussed about avoiding NSAIDs  As he has been taking NSAIDs every day for feet pain  -  Avoid nephrotoxins  Will see if renal function improved with stopping NSAIDs  Also slightly high creatinine could be because of high muscle mass in this patient  He has also been taking some supplements and protein drinks which may be contributing   to slightly elevated creatinine  Proteinuria  - will start on ACEI as BP elevated and has proteinuria  Proteinuria could be due to hypertensive nephrosclerosis  As well as use of NSAIDs  Stopping NSAIDs may also help proteinuria- discussed with patient  1  Benign essential HTN  losartan (COZAAR) 25 mg tablet    Basic metabolic panel    Basic metabolic panel    Protein / creatinine ratio, urine    Microalbumin / creatinine urine ratio    Urinalysis with microscopic    Phosphorus   2   Chronic kidney disease, stage 2 (mild)  POCT urine dip    Basic metabolic panel    Basic metabolic panel    Protein / creatinine ratio, urine    Microalbumin / creatinine urine ratio    Urinalysis with microscopic    Phosphorus 3  Proteinuria, unspecified type  losartan (COZAAR) 25 mg tablet    Protein / creatinine ratio, urine    Microalbumin / creatinine urine ratio       Return in about 2 months (around 10/3/2018) for Recheck  HISTORY OF PRESENT ILLNESS:  Aaliyah Winter is a 52y o  year old male with medical issues of hypertension x since age of 21 yrs , anxiety, history of  Recurrent syncope, last syncopal episode on July 28  who presents for initial consultation for uncontrolled HTN and elevated creatinine  AS per patient he gets anxious and then get episodes of lightheadedness and room spinning but does not pass out  HE has visual loss in rt eye after trauma to rt eye in his teens  BP at home 140- 059'S and diastolic BP usually 90-16 per patient  Last week BP elevated  Watching salt intake and going to Gym recently  Amlodipine dose was increased to 5 mg bid last week  He put on 25 lbs in 8 months  Labs from July 28, 2018 showed creatinine 1 5, bicarb 26, potassium 4 1  Previous creatinine from August 2015 to June 2018 has been around 1 3-1 5,  EGFR 63 hemoglobin is 15 4  LDL from January 2018 was 93  UA not done as an outpatient  UA in office positive for 1+ protein    Patient works at [employer] and complaints of feet pain because of prolonged standing and walking  He has been taking ibuprofen/ Aleve every day  Denies urinary symptoms    REVIEW OF SYSTEMS:    Review of Systems   Constitutional: Negative for activity change, appetite change, chills, diaphoresis, fatigue and fever  HENT: Negative for congestion, facial swelling and nosebleeds  Eyes: Positive for visual disturbance (blind rt eye)  Negative for pain  Respiratory: Negative for cough, chest tightness and shortness of breath  Cardiovascular: Negative for chest pain and palpitations  Gastrointestinal: Negative for abdominal distention, abdominal pain, diarrhea, nausea and vomiting     Genitourinary: Negative for difficulty urinating, dysuria, flank pain, frequency and hematuria  Musculoskeletal: Negative for arthralgias, back pain and joint swelling  Bilateral feet pain   Skin: Negative for rash  Neurological: Negative for dizziness, seizures, syncope, weakness and headaches  Psychiatric/Behavioral: Negative for agitation and confusion  The patient is not nervous/anxious  More than 10 point review of systems were obtained and discussed in length with the patient  PAST MEDICAL HISTORY:  Past Medical History:   Diagnosis Date    Anxiety     Blind right eye     Hypertension        PAST SURGICAL HISTORY:  Past Surgical History:   Procedure Laterality Date    EYE SURGERY         ALLERGIES:  Allergies   Allergen Reactions    Penicillins     Strawberry Extract        SOCIAL HISTORY:  History   Alcohol Use    Yes     Comment: occasional     History   Drug Use No     History   Smoking Status    Never Smoker   Smokeless Tobacco    Never Used       FAMILY HISTORY:  Family History   Problem Relation Age of Onset    Hypertension Mother     Cancer Father     Hypertension Father        MEDICATIONS:    Current Outpatient Prescriptions:     amLODIPine (NORVASC) 2 5 mg tablet, Take 5 mg by mouth 2 (two) times a day  , Disp: , Rfl:     escitalopram (LEXAPRO) 10 mg tablet, Take 5 mg by mouth daily, Disp: , Rfl:     LORazepam (ATIVAN) 0 5 mg tablet, Take 0 5 mg by mouth Medrol Dose Pack scheduling ONLY, Disp: , Rfl:       PHYSICAL EXAM:  Vitals:    08/03/18 0808   BP: 140/90   BP Location: Right arm   Patient Position: Sitting   Cuff Size: Large   Pulse: 70   Weight: 125 kg (276 lb)   Height: 6' 2" (1 88 m)     Body mass index is 35 44 kg/m²  Physical Exam   Constitutional: He is oriented to person, place, and time  Vital signs are normal  He appears well-developed  No distress  HENT:   Head: Normocephalic and atraumatic     Mouth/Throat: Oropharynx is clear and moist    Eyes: Conjunctivae are normal  Pupils are equal, round, and reactive to light  No scleral icterus  Neck: Neck supple  No thyromegaly present  Cardiovascular: Normal rate, regular rhythm and normal heart sounds  Exam reveals no friction rub  No murmur heard  Pulmonary/Chest: Effort normal and breath sounds normal  No respiratory distress  He has no wheezes  He has no rales  Abdominal: Soft  Bowel sounds are normal  He exhibits no distension  There is no tenderness  Musculoskeletal: He exhibits no edema or deformity  Lymphadenopathy:     He has no cervical adenopathy  Neurological: He is alert and oriented to person, place, and time  He is not disoriented  Skin: He is not diaphoretic  No cyanosis  No pallor  Nails show no clubbing  Psychiatric: He has a normal mood and affect  His mood appears not anxious  His affect is not inappropriate  Lab Results:   Results for orders placed or performed in visit on 08/03/18   POCT urine dip   Result Value Ref Range    LEUKOCYTE ESTERASE,UA NEG      NITRITE,UA NEG     SL AMB POCT UROBILINOGEN 0 2     SL AMB POCT URINE PROTEIN 100      PH,UA 5 0      BLOOD,UA NEG      SPECIFIC GRAVITY,UA 1 020      KETONES,UA NEG      BILIRUBIN,UA 1+     GLUCOSE, UA NEG      COLOR,UA YELLOW      CLARITY,UA CLEAR      Results for Izabel Das (MRN [id number]) as of 8/3/2018 08:39   Ref   Range 8/2/2015 00:49 12/5/2015 13:38 6/11/2018 12:20 7/28/2018 11:50   Sodium Latest Ref Range: 136 - 145 mmol/L 141  137 141   Potassium Latest Ref Range: 3 5 - 5 3 mmol/L 3 8  4 4 4 1   Chloride Latest Ref Range: 100 - 108 mmol/L 104  101 106   CO2 Latest Ref Range: 21 - 32 mmol/L 26 8  31 26   Anion Gap Latest Ref Range: 4 - 13 mmol/L 10 15 5 9   BUN Latest Ref Range: 5 - 25 mg/dL 11  13 13   Creatinine Latest Ref Range: 0 60 - 1 30 mg/dL 1 50 (H)  1 35 (H) 1 50 (H)   Glucose Latest Ref Range: 65 - 140 mg/dL 116  89 104   Calcium Latest Ref Range: 8 3 - 10 1 mg/dL 8 7  9 5 8 7   AST Latest Ref Range: 5 - 45 U/L 27   23 ALT Latest Ref Range: 12 - 78 U/L 34   29   Alkaline Phosphatase Latest Ref Range: 46 - 116 U/L 53   49   Total Protein Latest Ref Range: 6 4 - 8 2 g/dL 7 3   7 3   Albumin Latest Ref Range: 3 5 - 5 0 g/dL 3 6   3 6   Total Bilirubin Latest Ref Range: 0 20 - 1 00 mg/dL 0 51   0 63   eGFR Latest Units: ml/min/1 73sq m   72 63   Magnesium Latest Ref Range: 1 6 - 2 6 mg/dL 2 0      CHLORIDE, ISTAT Latest Ref Range: 100 - 108 mmol/L  104       Portions of the record may have been created with voice recognition software  Occasional wrong word or "sound a like" substitutions may have occurred due to the inherent limitations of voice recognition software  Read the chart carefully and recognize, using context, where substitutions have occurred

## 2018-08-03 NOTE — LETTER
August 3, 2018     Kimberly Adrianedeborah, [address]    Patient: Blanche Bennett   YOB: 1970   Date of Visit: 8/3/2018       Dear Dr Coni eRyes: Thank you for referring Elizabeth Cheema to me for evaluation  Below are my notes for this consultation  If you have questions, please do not hesitate to call me  I look forward to following your patient along with you  Sincerely,        Shannon Atkinson MD        CC: No Recipients  Shannon Atkinson MD  8/3/2018  8:59 AM  Sign at close encounter  [address] 52 y o  male MRN: [id number]  Date: 8/3/2018  Reason for consultation:   Chief Complaint   Patient presents with    Consult    Abnormal Lab    Hypertension       ASSESSMENT AND PLAN:    Primary HTN  - BP elevated  Started on losartan for hypertension as well as proteinuria  Check BMP in 2 weeks  If tolerated would consider increasing dose of losartan further and adjusting amlodipine dose as losartan would also help with proteinuria  Discussed about side effects of losartan such as increase in creatinine as well as hyperkalemia  - stopping NSAIDs would also help control of blood pressure  -  Discussed home monitoring of blood pressure  Goal blood pressure less than 130/80  CKD stage 2  - has egfr of around 63 with proteinuria  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 as well as use of NSAIDs   -  Discussed about avoiding NSAIDs  As he has been taking NSAIDs every day for feet pain  -  Avoid nephrotoxins  Follow-up in 2 months  Will see if renal function improved with stopping NSAIDs  Also slightly high creatinine could be because of high muscle mass in this patient  He has also been taking some supplements and protein drinks which may be contributing   To slightly elevated creatinine  Proteinuria  - will start on ACEI as BP elevated and has proteinuria    Proteinuria could be due to hypertensive nephrosclerosis  As well as use of NSAIDs  Stopping NSAIDs may also help proteinuria- discussed with patient  1  Benign essential HTN  losartan (COZAAR) 25 mg tablet    Basic metabolic panel    Basic metabolic panel    Protein / creatinine ratio, urine    Microalbumin / creatinine urine ratio    Urinalysis with microscopic    Phosphorus   2  Chronic kidney disease, stage 2 (mild)  POCT urine dip    Basic metabolic panel    Basic metabolic panel    Protein / creatinine ratio, urine    Microalbumin / creatinine urine ratio    Urinalysis with microscopic    Phosphorus   3  Proteinuria, unspecified type  losartan (COZAAR) 25 mg tablet    Protein / creatinine ratio, urine    Microalbumin / creatinine urine ratio       Return in about 2 months (around 10/3/2018) for Recheck  HISTORY OF PRESENT ILLNESS:  Nilton Hamilton is a 52y o  year old male with medical issues of hypertension x since age of 21 yrs , anxiety, history of  Recurrent syncope, last syncopal episode on July 28  who presents for initial consultation for uncontrolled HTN and elevated creatinine  AS per patient he gets anxious and then get episodes of lightheadedness and room spinning but does not pass out  HE has visual loss in rt eye after trauma to rt eye in his teens  BP at home 140- 871'M and diastolic BP usually 19-05 per patient  Last week BP elevated  Watching salt intake and going to Gym recently  Amlodipine dose was increased to 5 mg bid last week  He put on 25 lbs in 8 months  Labs from July 28, 2018 showed creatinine 1 5, bicarb 26, potassium 4 1  Previous creatinine from August 2015 to June 2018 has been around 1 3-1 5,  EGFR 63 hemoglobin is 15 4  LDL from January 2018 was 93  UA not done as an outpatient  UA in office positive for 1+ protein    Patient works at [employer] and complaints of feet pain because of prolonged standing and walking  He has been taking ibuprofen/ Aleve every day         Denies urinary symptoms    REVIEW OF SYSTEMS:    Review of Systems   Constitutional: Negative for activity change, appetite change, chills, diaphoresis, fatigue and fever  HENT: Negative for congestion, facial swelling and nosebleeds  Eyes: Positive for visual disturbance (blind rt eye)  Negative for pain  Respiratory: Negative for cough, chest tightness and shortness of breath  Cardiovascular: Negative for chest pain and palpitations  Gastrointestinal: Negative for abdominal distention, abdominal pain, diarrhea, nausea and vomiting  Genitourinary: Negative for difficulty urinating, dysuria, flank pain, frequency and hematuria  Musculoskeletal: Negative for arthralgias, back pain and joint swelling  Bilateral feet pain   Skin: Negative for rash  Neurological: Negative for dizziness, seizures, syncope, weakness and headaches  Psychiatric/Behavioral: Negative for agitation and confusion  The patient is not nervous/anxious  More than 10 point review of systems were obtained and discussed in length with the patient       PAST MEDICAL HISTORY:  Past Medical History:   Diagnosis Date    Anxiety     Blind right eye     Hypertension        PAST SURGICAL HISTORY:  Past Surgical History:   Procedure Laterality Date    EYE SURGERY         ALLERGIES:  Allergies   Allergen Reactions    Penicillins     Strawberry Extract        SOCIAL HISTORY:  History   Alcohol Use    Yes     Comment: occasional     History   Drug Use No     History   Smoking Status    Never Smoker   Smokeless Tobacco    Never Used       FAMILY HISTORY:  Family History   Problem Relation Age of Onset    Hypertension Mother     Cancer Father     Hypertension Father        MEDICATIONS:    Current Outpatient Prescriptions:     amLODIPine (NORVASC) 2 5 mg tablet, Take 5 mg by mouth 2 (two) times a day  , Disp: , Rfl:     escitalopram (LEXAPRO) 10 mg tablet, Take 5 mg by mouth daily, Disp: , Rfl:     LORazepam (ATIVAN) 0 5 mg tablet, Take 0 5 mg by mouth Medrol Dose Pack scheduling ONLY, Disp: , Rfl:       PHYSICAL EXAM:  Vitals:    08/03/18 0808   BP: 140/90   BP Location: Right arm   Patient Position: Sitting   Cuff Size: Large   Pulse: 70   Weight: 125 kg (276 lb)   Height: 6' 2" (1 88 m)     Body mass index is 35 44 kg/m²  Physical Exam   Constitutional: He is oriented to person, place, and time  Vital signs are normal  He appears well-developed  No distress  HENT:   Head: Normocephalic and atraumatic  Mouth/Throat: Oropharynx is clear and moist    Eyes: Conjunctivae are normal  Pupils are equal, round, and reactive to light  No scleral icterus  Neck: Neck supple  No thyromegaly present  Cardiovascular: Normal rate, regular rhythm and normal heart sounds  Exam reveals no friction rub  No murmur heard  Pulmonary/Chest: Effort normal and breath sounds normal  No respiratory distress  He has no wheezes  He has no rales  Abdominal: Soft  Bowel sounds are normal  He exhibits no distension  There is no tenderness  Musculoskeletal: He exhibits no edema or deformity  Lymphadenopathy:     He has no cervical adenopathy  Neurological: He is alert and oriented to person, place, and time  He is not disoriented  Skin: He is not diaphoretic  No cyanosis  No pallor  Nails show no clubbing  Psychiatric: He has a normal mood and affect  His mood appears not anxious  His affect is not inappropriate  Lab Results:   Results for orders placed or performed in visit on 08/03/18   POCT urine dip   Result Value Ref Range    LEUKOCYTE ESTERASE,UA NEG      NITRITE,UA NEG     SL AMB POCT UROBILINOGEN 0 2     SL AMB POCT URINE PROTEIN 100      PH,UA 5 0      BLOOD,UA NEG      SPECIFIC GRAVITY,UA 1 020      KETONES,UA NEG      BILIRUBIN,UA 1+     GLUCOSE, UA NEG      COLOR,UA YELLOW      CLARITY,UA CLEAR      Results for Antony Arevalo (MRN [id number]) as of 8/3/2018 08:39   Ref   Range 8/2/2015 00:49 12/5/2015 13:38 6/11/2018 12:20 7/28/2018 11:50   Sodium Latest Ref Range: 136 - 145 mmol/L 141  137 141   Potassium Latest Ref Range: 3 5 - 5 3 mmol/L 3 8  4 4 4 1   Chloride Latest Ref Range: 100 - 108 mmol/L 104  101 106   CO2 Latest Ref Range: 21 - 32 mmol/L 26 8  31 26   Anion Gap Latest Ref Range: 4 - 13 mmol/L 10 15 5 9   BUN Latest Ref Range: 5 - 25 mg/dL 11  13 13   Creatinine Latest Ref Range: 0 60 - 1 30 mg/dL 1 50 (H)  1 35 (H) 1 50 (H)   Glucose Latest Ref Range: 65 - 140 mg/dL 116  89 104   Calcium Latest Ref Range: 8 3 - 10 1 mg/dL 8 7  9 5 8 7   AST Latest Ref Range: 5 - 45 U/L 27   23   ALT Latest Ref Range: 12 - 78 U/L 34   29   Alkaline Phosphatase Latest Ref Range: 46 - 116 U/L 53   49   Total Protein Latest Ref Range: 6 4 - 8 2 g/dL 7 3   7 3   Albumin Latest Ref Range: 3 5 - 5 0 g/dL 3 6   3 6   Total Bilirubin Latest Ref Range: 0 20 - 1 00 mg/dL 0 51   0 63   eGFR Latest Units: ml/min/1 73sq m   72 63   Magnesium Latest Ref Range: 1 6 - 2 6 mg/dL 2 0      CHLORIDE, ISTAT Latest Ref Range: 100 - 108 mmol/L  104       Portions of the record may have been created with voice recognition software  Occasional wrong word or "sound a like" substitutions may have occurred due to the inherent limitations of voice recognition software  Read the chart carefully and recognize, using context, where substitutions have occurred

## 2018-08-03 NOTE — PATIENT INSTRUCTIONS
You have chronic kidney disease stage 2  Chronic kidney disease could be because of high blood pressure as well as use of NSAIDs  Recommend avoiding ibuprofen, Aleve, Motrin  Blood pressure is elevated, goal blood pressure is less than 130/80  Recommend home monitoring of blood pressure and low-salt diet as discussed in the office  Keep a log of blood pressure at home  I have started you on losartan 25 milligram daily in addition to amlodipine 5 milligram twice daily  Blood work in 2 weeks  Follow-up in 2 months along with blood work and urine test before the office visit
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
## 2018-10-31 ENCOUNTER — TELEPHONE (OUTPATIENT)
Dept: NEPHROLOGY | Facility: CLINIC | Age: 48
End: 2018-10-31

## 2018-10-31 LAB
EXT GLUCOSE BLD: 107
EXTERNAL ALBUMIN: 3.9
EXTERNAL ALK PHOS: 48
EXTERNAL ALT: 21
EXTERNAL AST: 22
EXTERNAL BUN: 13
EXTERNAL CALCIUM: 9.5
EXTERNAL CHLORIDE: 102
EXTERNAL CO2: 28
EXTERNAL CREATININE: 1.38
EXTERNAL EGFR: 60
EXTERNAL POTASSIUM: 4.2
EXTERNAL SODIUM: 137
EXTERNAL T.BILIRUBIN: 0.8
EXTERNAL TOTAL PROTEIN: 6.8

## 2018-10-31 NOTE — TELEPHONE ENCOUNTER
I left a message for patient to call the office no answer regarding follow up appt and labs from 10/3

## 2018-10-31 NOTE — TELEPHONE ENCOUNTER
Not able to reach patient, going to voice mail  Blood work from 10/03 showed cr of 1 38, K 4 2  Renal function stable despite initiation of losartan

## 2018-12-14 ENCOUNTER — TELEPHONE (OUTPATIENT)
Dept: NEPHROLOGY | Facility: CLINIC | Age: 48
End: 2018-12-14

## 2018-12-14 NOTE — TELEPHONE ENCOUNTER
Left message for patient to see if patient would like to schedule a follow up appointment  I sent a letter out to contact us to schedule

## 2019-09-18 LAB — HBA1C MFR BLD HPLC: 5.6 %

## 2023-09-08 ENCOUNTER — HOSPITAL ENCOUNTER (EMERGENCY)
Facility: HOSPITAL | Age: 53
Discharge: HOME | End: 2023-09-09
Attending: EMERGENCY MEDICINE
Payer: COMMERCIAL

## 2023-09-08 ENCOUNTER — APPOINTMENT (EMERGENCY)
Dept: CT IMAGING | Facility: HOSPITAL | Age: 53
End: 2023-09-08
Payer: COMMERCIAL

## 2023-09-08 ENCOUNTER — APPOINTMENT (EMERGENCY)
Dept: RADIOLOGY | Facility: HOSPITAL | Age: 53
End: 2023-09-08
Payer: COMMERCIAL

## 2023-09-08 VITALS
RESPIRATION RATE: 18 BRPM | HEART RATE: 77 BPM | WEIGHT: 314.16 LBS | BODY MASS INDEX: 40.34 KG/M2 | TEMPERATURE: 98.2 F | DIASTOLIC BLOOD PRESSURE: 84 MMHG | OXYGEN SATURATION: 97 % | SYSTOLIC BLOOD PRESSURE: 146 MMHG

## 2023-09-08 DIAGNOSIS — R22.42 LOCALIZED SWELLING OF LEFT LOWER EXTREMITY: ICD-10-CM

## 2023-09-08 DIAGNOSIS — M54.50 LOWER BACK PAIN: Primary | ICD-10-CM

## 2023-09-08 PROCEDURE — 72131 CT LUMBAR SPINE W/O DYE: CPT

## 2023-09-08 PROCEDURE — 73610 X-RAY EXAM OF ANKLE: CPT

## 2023-09-08 PROCEDURE — 99284 EMERGENCY DEPT VISIT MOD MDM: CPT

## 2023-09-08 PROCEDURE — G1004 CDSM NDSC: HCPCS

## 2023-09-08 PROCEDURE — 99284 EMERGENCY DEPT VISIT MOD MDM: CPT | Performed by: EMERGENCY MEDICINE

## 2023-09-08 RX ORDER — LIDOCAINE 50 MG/G
1 PATCH TOPICAL ONCE
Status: DISCONTINUED | OUTPATIENT
Start: 2023-09-08 | End: 2023-09-09 | Stop reason: HOSPADM

## 2023-09-08 RX ADMIN — LIDOCAINE 1 PATCH: 700 PATCH TOPICAL at 23:37
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
## 2023-09-09 NOTE — DISCHARGE INSTRUCTIONS
You are being evaluated today for low back pain and left ankle swelling. Your CT scan was normal.  A referral was placed for the comprehensive spine program, you may call them if they do not call you for an appointment. You may continue using ibuprofen/tylenol judiciously, do not exceed 1 week of straight use. Follow-up with your family physician for evaluation of ankle swelling, your x-rays are normal today.

## 2023-09-11 ENCOUNTER — TELEPHONE (OUTPATIENT)
Dept: PHYSICAL THERAPY | Facility: OTHER | Age: 53
End: 2023-09-11
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
